# Patient Record
Sex: FEMALE | Race: WHITE | Employment: STUDENT | URBAN - METROPOLITAN AREA
[De-identification: names, ages, dates, MRNs, and addresses within clinical notes are randomized per-mention and may not be internally consistent; named-entity substitution may affect disease eponyms.]

---

## 2018-08-23 ENCOUNTER — OFFICE VISIT (OUTPATIENT)
Dept: FAMILY MEDICINE CLINIC | Age: 48
End: 2018-08-23

## 2018-08-23 VITALS
RESPIRATION RATE: 16 BRPM | HEIGHT: 67 IN | DIASTOLIC BLOOD PRESSURE: 72 MMHG | WEIGHT: 145 LBS | SYSTOLIC BLOOD PRESSURE: 122 MMHG | OXYGEN SATURATION: 99 % | TEMPERATURE: 97.8 F | HEART RATE: 61 BPM | BODY MASS INDEX: 22.76 KG/M2

## 2018-08-23 DIAGNOSIS — J45.30 MILD PERSISTENT ASTHMA WITHOUT COMPLICATION: ICD-10-CM

## 2018-08-23 DIAGNOSIS — Z13.220 SCREENING FOR LIPID DISORDERS: ICD-10-CM

## 2018-08-23 DIAGNOSIS — R00.2 HEART PALPITATIONS: ICD-10-CM

## 2018-08-23 DIAGNOSIS — D22.9 MULTIPLE NEVI: ICD-10-CM

## 2018-08-23 DIAGNOSIS — D50.9 IRON DEFICIENCY ANEMIA, UNSPECIFIED IRON DEFICIENCY ANEMIA TYPE: ICD-10-CM

## 2018-08-23 DIAGNOSIS — Z00.00 ROUTINE PHYSICAL EXAMINATION: Primary | ICD-10-CM

## 2018-08-23 DIAGNOSIS — Z12.39 SCREENING FOR BREAST CANCER: ICD-10-CM

## 2018-08-23 RX ORDER — BUDESONIDE AND FORMOTEROL FUMARATE DIHYDRATE 160; 4.5 UG/1; UG/1
2 AEROSOL RESPIRATORY (INHALATION) DAILY
COMMUNITY
End: 2018-08-23 | Stop reason: SDUPTHER

## 2018-08-23 RX ORDER — BUDESONIDE AND FORMOTEROL FUMARATE DIHYDRATE 160; 4.5 UG/1; UG/1
2 AEROSOL RESPIRATORY (INHALATION) 2 TIMES DAILY
Qty: 3 INHALER | Refills: 3 | Status: SHIPPED | OUTPATIENT
Start: 2018-08-23 | End: 2020-01-20 | Stop reason: SDUPTHER

## 2018-08-23 RX ORDER — ALBUTEROL SULFATE 90 UG/1
2 AEROSOL, METERED RESPIRATORY (INHALATION)
Qty: 3 INHALER | Refills: 3 | Status: SHIPPED | OUTPATIENT
Start: 2018-08-23 | End: 2020-01-20 | Stop reason: SDUPTHER

## 2018-08-23 RX ORDER — ALBUTEROL SULFATE 90 UG/1
AEROSOL, METERED RESPIRATORY (INHALATION)
COMMUNITY
End: 2018-08-23 | Stop reason: SDUPTHER

## 2018-08-23 RX ORDER — BUDESONIDE AND FORMOTEROL FUMARATE DIHYDRATE 160; 4.5 UG/1; UG/1
2 AEROSOL RESPIRATORY (INHALATION) 2 TIMES DAILY
Status: CANCELLED | OUTPATIENT
Start: 2018-08-23

## 2018-08-23 NOTE — MR AVS SNAPSHOT
[address] 
[phone] Patient: Isaías Young MRN: [id number] [id number] Visit Information Date & Time Provider Department Dept. Phone Encounter #  
 2018  2:30 PM Reynaldo Campbell, [address] [place] [address] [id number] Follow-up Instructions Return in about 1 year (around 2019). Upcoming Health Maintenance Date Due DTaP/Tdap/Td series (1 - Tdap) 1993 PAP AKA CERVICAL CYTOLOGY 1993 Influenza Age 5 to Adult 2018 Allergies as of 2018  Review Complete On: 2018 By: LADY Patterson No Known Allergies Current Immunizations  Never Reviewed No immunizations on file. Not reviewed this visit You Were Diagnosed With   
  
 Codes Comments Routine physical examination    -  Primary ICD-10-CM: Z00.00 ICD-9-CM: V70.0 Screening for breast cancer     ICD-10-CM: Z12.31 
ICD-9-CM: V76.10 Mild persistent asthma without complication     SEAN-67-AE: J45.30 ICD-9-CM: 493.90 Heart palpitations     ICD-10-CM: R00.2 ICD-9-CM: 785.1 Multiple nevi     ICD-10-CM: D22.9 ICD-9-CM: 216.9 Screening for lipid disorders     ICD-10-CM: Z13.220 ICD-9-CM: V77.91 Iron deficiency anemia, unspecified iron deficiency anemia type     ICD-10-CM: D50.9 ICD-9-CM: 280.9 Vitals BP Pulse Temp Resp Height(growth percentile) Weight(growth percentile) 122/72 (BP 1 Location: Left arm, BP Patient Position: Sitting) 61 97.8 °F (36.6 °C) (Oral) 16 5' 7.25\" (1.708 m) 145 lb (65.8 kg) LMP SpO2 BMI OB Status Smoking Status 2018 99% 22.54 kg/m2 Having regular periods Never Smoker Vitals History BMI and BSA Data Body Mass Index Body Surface Area  
 22.54 kg/m 2 1.77 m 2 Preferred Pharmacy Pharmacy Name Phone [address] [phone] Your Updated Medication List  
  
   
This list is accurate as of 8/23/18  3:57 PM.  Always use your most recent med list.  
  
  
  
  
 albuterol 90 mcg/actuation inhaler Commonly known as:  PROVENTIL HFA, VENTOLIN HFA, PROAIR HFA Take 2 Puffs by inhalation every four (4) hours as needed for Wheezing. budesonide-formoterol 160-4.5 mcg/actuation Hfaa Commonly known as:  SYMBICORT Take 2 Puffs by inhalation two (2) times a day. Prescriptions Printed Refills  
 albuterol (PROVENTIL HFA, VENTOLIN HFA, PROAIR HFA) 90 mcg/actuation inhaler 3 Sig: Take 2 Puffs by inhalation every four (4) hours as needed for Wheezing. Class: Print Route: Inhalation  
 budesonide-formoterol (SYMBICORT) 160-4.5 mcg/actuation HFAA 3 Sig: Take 2 Puffs by inhalation two (2) times a day. Class: Print Route: Inhalation We Performed the Following REFERRAL TO CARDIOLOGY [ULV37 Custom] REFERRAL TO DERMATOLOGY [REF19 Custom] Follow-up Instructions Return in about 1 year (around 8/23/2019). To-Do List   
 08/23/2018 Lab:  IRON PROFILE   
  
 08/23/2018 Imaging:  [facility] MAMMO BI SCREENING INCL CAD   
  
 08/23/2018 Lab:  METABOLIC PANEL, COMPREHENSIVE Around 08/24/2018 Lab:  CBC WITH AUTOMATED DIFF Around 08/24/2018 Lab:  FERRITIN Around 08/24/2018 Lab:  LIPID PANEL Around 08/24/2018 Lab:  TSH 3RD GENERATION Referral Information Referral ID Referred By Referred To  
  
 [id number] Rosalind Hannah 83, DO   
   [facility] Cristino [facility] [address] [place], [address] [facility] [address] Phone: [phone] Fax: [phone] Visits Status Start Date End Date 1 New Request 8/23/18 8/23/19 If your referral has a status of pending review or denied, additional information will be sent to support the outcome of this decision. Referral ID Referred By Referred To [id number] [facility] [place] [address] Junie Song Phone: [phone] Fax: [phone] Visits Status Start Date End Date 1 New Request 8/23/18 8/23/19 If your referral has a status of pending review or denied, additional information will be sent to support the outcome of this decision. Patient Instructions A Healthy Lifestyle: Care Instructions Your Care Instructions A healthy lifestyle can help you feel good, stay at a healthy weight, and have plenty of energy for both work and play. A healthy lifestyle is something you can share with your whole family. A healthy lifestyle also can lower your risk for serious health problems, such as high blood pressure, heart disease, and diabetes. You can follow a few steps listed below to improve your health and the health of your family. Follow-up care is a key part of your treatment and safety. Be sure to make and go to all appointments, and call your doctor if you are having problems. It's also a good idea to know your test results and keep a list of the medicines you take. How can you care for yourself at home? · Do not eat too much sugar, fat, or fast foods. You can still have dessert and treats now and then. The goal is moderation. · Start small to improve your eating habits. Pay attention to portion sizes, drink less juice and soda pop, and eat more fruits and vegetables. ¨ Eat a healthy amount of food. A 3-ounce serving of meat, for example, is about the size of a deck of cards. Fill the rest of your plate with vegetables and whole grains. ¨ Limit the amount of soda and sports drinks you have every day. Drink more water when you are thirsty. ¨ Eat at least 5 servings of fruits and vegetables every day.  It may seem like a lot, but it is not hard to reach this goal. A serving or helping is 1 piece of fruit, 1 cup of vegetables, or 2 cups of leafy, raw vegetables. Have an apple or some carrot sticks as an afternoon snack instead of a candy bar. Try to have fruits and/or vegetables at every meal. 
· Make exercise part of your daily routine. You may want to start with simple activities, such as walking, bicycling, or slow swimming. Try to be active 30 to 60 minutes every day. You do not need to do all 30 to 60 minutes all at once. For example, you can exercise 3 times a day for 10 or 20 minutes. Moderate exercise is safe for most people, but it is always a good idea to talk to your doctor before starting an exercise program. 
· Keep moving. Hien Briggsis the lawn, work in the garden, or [employer]. Take the stairs instead of the elevator at work. · If you smoke, quit. People who smoke have an increased risk for heart attack, stroke, cancer, and other lung illnesses. Quitting is hard, but there are ways to boost your chance of quitting tobacco for good. ¨ Use nicotine gum, patches, or lozenges. ¨ Ask your doctor about stop-smoking programs and medicines. ¨ Keep trying. In addition to reducing your risk of diseases in the future, you will notice some benefits soon after you stop using tobacco. If you have shortness of breath or asthma symptoms, they will likely get better within a few weeks after you quit. · Limit how much alcohol you drink. Moderate amounts of alcohol (up to 2 drinks a day for men, 1 drink a day for women) are okay. But drinking too much can lead to liver problems, high blood pressure, and other health problems. Family health If you have a family, there are many things you can do together to improve your health. · Eat meals together as a family as often as possible. · Eat healthy foods. This includes fruits, vegetables, lean meats and dairy, and whole grains. · Include your family in your fitness plan.  Most people think of activities such as jogging or tennis as the way to fitness, but there are many ways you and your family can be more active. Anything that makes you breathe hard and gets your heart pumping is exercise. Here are some tips: 
¨ Walk to do errands or to take your child to school or the bus. ¨ Go for a family bike ride after dinner instead of watching TV. Where can you learn more? Go to http://aidee-emily.info/. Enter F754 in the search box to learn more about \"A Healthy Lifestyle: Care Instructions. \" Current as of: December 7, 2017 Content Version: 11.7 © 0930-2044 [employer]. Care instructions adapted under license by [employer] (which disclaims liability or warranty for this information). If you have questions about a medical condition or this instruction, always ask your healthcare professional. [address] any warranty or liability for your use of this information. Introducing [facility]! [facility] introduces [employer] patient portal. Now you can access parts of your medical record, email your doctor's office, and request medication refills online. 1. In your internet browser, go to [email or web address] 2. Click on the First Time User? Click Here link in the Sign In box. You will see the New Member Sign Up page. 3. Enter your [employer] Access Code exactly as it appears below. You will not need to use this code after youve completed the sign-up process. If you do not sign up before the expiration date, you must request a new code. · [employer] Access Code: [email or web address] Expires: 11/21/2018  3:27 PM 
 
4. Enter the last four digits of your Social Security Number (xxxx) and Date of Birth (mm/dd/yyyy) as indicated and click Submit. You will be taken to the next sign-up page. 5. Create a Tr[employer]parqt ID.  This will be your [employer] login ID and cannot be changed, so think of one that is secure and easy to remember. 6. Create a [employer] password. You can change your password at any time. 7. Enter your Password Reset Question and Answer. This can be used at a later time if you forget your password. 8. Enter your e-mail address. You will receive e-mail notification when new information is available in [address] 9. Click Sign Up. You can now view and download portions of your medical record. 10. Click the Download Summary menu link to download a portable copy of your medical information. If you have questions, please visit the Frequently Asked Questions section of the [employer] website. Remember, [employer] is NOT to be used for urgent needs. For medical emergencies, dial 911. Now available from your iPhone and Android! Please provide this summary of care documentation to your next provider. Your primary care clinician is listed as Reynaldo Campbell. If you have any questions after today's visit, please call [phone].

## 2018-08-23 NOTE — PROGRESS NOTES
Patient: Paulie Tracy MRN: [id number]  SSN: [id number]    YOB: 1970  Age: 57 y.o. Sex: female      Date of Service: 8/23/2018   Provider: LADY Glez   Office Location:   [address] [place] [address] Dr Vick reynoso, [address] [facility].  Office Phone: [phone]  Office Fax: [phone]        REASON FOR VISIT:   Chief Complaint   Patient presents with    New Patient    Medication Refill        VITALS:   Visit Vitals    /72 (BP 1 Location: Left arm, BP Patient Position: Sitting)    Pulse 61    Temp 97.8 °F (36.6 °C) (Oral)    Resp 16    Ht 5' 7.25\" (1.708 m)    Wt 145 lb (65.8 kg)    LMP 08/20/2018    SpO2 99%    BMI 22.54 kg/m2       MEDICATIONS:        ALLERGIES:   No Known Allergies     MEDICAL/SURGICAL HISTORY:  Past Medical History:   Diagnosis Date    Anemia     Asthma       No past surgical history on file. FAMILY HISTORY:  No family history on file. SOCIAL HISTORY:  Social History   Substance Use Topics    Smoking status: Never Smoker    Smokeless tobacco: Never Used    Alcohol use Yes      Comment: rare            HISTORY OF PRESENT ILLNESS:   Pualie Tracy is a 57 y.o. female who presents to the office to establish care as a new patient. She is relatively new to the area. Her family moved here last summer for her 's job in the [employer]. Patient reports she has been in generally good health. She follows a healthy diet and exercises regularly. Asthma -   Mild-moderate persistent. Has been well controlled on Symbicort, with some failed attempts at de-escalating. Triggers include exercise, and she only uses her albuterol inhaler before a long run or strenuous workout. She needs refills sent to [facility] today. Anemia -   Patient reports a history of iron deficiency anemia. She follows a vegetarian diet.  She takes a daily iron supplement    Palpitations -   Patient reports a long history of intermittent heart palpitations. This was last worked up a few years ago by EKG, echo, and Holter monitor. Per the patient, there was no evidence of structural heart disease, just some \"extra beats\" on electrocardiography. She reports palpitations are triggered by caffeine, and by holding her breath (such as when she's swimming). They seem to be getting a bit more frequent lately. Multiple Nevi -   Patient requests a referral to dermatology. She likes to get skin checks periodically due to her multiple moles. She has no personal history of skin ca, and no family history of melanoma. Health Maintenance -   [facility] PCP - [facility]   Last PE with routine labs - PE a little over a year ago, labs have not been done in several years  Last pap smear - 2017, normal per patient   Last mammogram - 2017, normal per patient   Immunization status - UTD     REVIEW OF SYSTEMS:  ROS (see scanned H&P form for complete 12 point ROS)     PHYSICAL EXAMINATION:  Physical Exam   Constitutional: She is oriented to person, place, and time and well-developed, well-nourished, and in no distress. HENT:   Head: Normocephalic and atraumatic. Right Ear: External ear normal.   Left Ear: External ear normal.   Nose: Nose normal.   Mouth/Throat: Oropharynx is clear and moist.   Eyes: Conjunctivae are normal. Pupils are equal, round, and reactive to light. Right eye exhibits no discharge. Left eye exhibits no discharge. Neck: Neck supple. No thyromegaly present. Cardiovascular: Normal rate, regular rhythm and normal heart sounds. Exam reveals no gallop and no friction rub. No murmur heard. Pulmonary/Chest: Effort normal and breath sounds normal. No stridor. She has no wheezes. She has no rales. Abdominal: Soft. Bowel sounds are normal. She exhibits no distension and no mass. There is no tenderness. There is no rebound and no guarding. Lymphadenopathy:     She has no cervical adenopathy.    Neurological: She is alert and oriented to person, place, and time. Gait normal.   Skin: Skin is warm and dry. No rash noted. Psychiatric: Mood, memory and affect normal.        RESULTS:  No results found for this visit on 08/23/18. ASSESSMENT/PLAN:  Diagnoses and all orders for this visit:    1. Routine physical examination  - Normal physical exam findings today  - Age and gender specific counseling provided  - Copy of recent pap requested. Will be due in spring of 2020     2. Screening for breast cancer  - Mammo ordered  Orders:    -     [facility] MAMMO BI SCREENING INCL CAD; Future    3. Mild persistent asthma without complication  - Stable on current regimen  - meds refilled  Orders:    -     albuterol (PROVENTIL HFA, VENTOLIN HFA, PROAIR HFA) 90 mcg/actuation inhaler; Take 2 Puffs by inhalation every four (4) hours as needed for Wheezing.  -     budesonide-formoterol (SYMBICORT) 160-4.5 mcg/actuation HFAA; Take 2 Puffs by inhalation two (2) times a day. 4. Heart palpitations  - Will refer to cardiology for further evaluation, to clarify that these are truly benign extrasystoles and determine whether or not patient may benefit from a beta blocker  Orders:    -     Oscar Manifold ref [facility]  -     TSH 3RD GENERATION; Future  -     CBC WITH AUTOMATED DIFF; Future    5. Multiple nevi  - Referred for skin check  Orders:    -     REFERRAL TO DERMATOLOGY    6. Screening for lipid disorders  - Check routine fasting labs at earliest convenience  Orders:    -     LIPID PANEL; Future  -     METABOLIC PANEL, COMPREHENSIVE; Future    7. Iron deficiency anemia, unspecified iron deficiency anemia type   - Check labs  - Continue iron supplement for now  Orders:    -     CBC WITH AUTOMATED DIFF; Future  -     FERRITIN; Future  -     IRON PROFILE; Future        Follow-up Disposition:  Return in about 1 year (around 8/23/2019).        PATIENT CARE TEAM:   Patient Care Team:  LADY Luna as PCP - General (Physician Assistant)       Christie Carbajal Earline Curl, [address]   August 23, 2018    4:38 PM

## 2018-08-23 NOTE — PROGRESS NOTES
1. Have you been to the ER, urgent care clinic since your last visit? Hospitalized since your last visit? No    2. Have you seen or consulted any other health care providers outside of the [address] [place] [address] since your last visit? Include any pap smears or colon screening. No    Last pap smear - 2017 [facility]   Last flu vaccine 2017

## 2018-08-23 NOTE — PATIENT INSTRUCTIONS

## 2018-11-30 ENCOUNTER — HOSPITAL ENCOUNTER (OUTPATIENT)
Dept: MAMMOGRAPHY | Age: 48
Discharge: HOME | End: 2018-11-30
Attending: PHYSICIAN ASSISTANT
Payer: OTHER GOVERNMENT

## 2018-11-30 DIAGNOSIS — Z12.39 SCREENING FOR BREAST CANCER: ICD-10-CM

## 2018-11-30 PROCEDURE — 77067 SCR MAMMO BI INCL CAD: CPT

## 2018-11-30 PROCEDURE — 77063 BREAST TOMOSYNTHESIS BI: CPT

## 2018-12-04 ENCOUNTER — TELEPHONE (OUTPATIENT)
Dept: FAMILY MEDICINE CLINIC | Age: 48
End: 2018-12-04

## 2018-12-04 DIAGNOSIS — R92.8 ABNORMALITY OF BOTH BREASTS ON SCREENING MAMMOGRAM: Primary | ICD-10-CM

## 2018-12-04 NOTE — TELEPHONE ENCOUNTER
1: 17 p.m Called home number. Verified name and . Spoke with patient. Patient notified of recent results per provider. Patient understood the reason for call and had no further questions or concerns.

## 2018-12-04 NOTE — PROGRESS NOTES
Please notify patient that mammogram showed a possible abnormality in both breasts. If she is able to obtain the images from a previous mammogram to send to our breast center, they will be able to compare and see if this is a new finding, or if her breasts have always appeared this way. The other option would be to return for a diagnostic mammogram and ultrasound for further evaluation. I will place the orders, and she can follow up with the breast center however she chooses to proceed.

## 2018-12-04 NOTE — PROGRESS NOTES
Called home number. Verified name and . Spoke with patient. Patient notified of results below per Debbie NARAYANAN. Patient understood the reason for call and had no further questions or concerns.

## 2018-12-11 NOTE — PROGRESS NOTES
Called home number. Verified name and . Spoke with patient. Patient notified of message below per Jalen NARAYANAN. Patient understood the reason for call and had no further questions or concerns.

## 2018-12-11 NOTE — PROGRESS NOTES
After comparison to her prior mammograms, findings were determined to benign and stable. Will repeat mammogram in 1 year.

## 2018-12-11 NOTE — PROGRESS NOTES
Called home number. Left message on answering machine to return the call. This call was concerning message below per Emeterio NARAYANAN.

## 2019-10-01 DIAGNOSIS — J45.30 MILD PERSISTENT ASTHMA WITHOUT COMPLICATION: ICD-10-CM

## 2019-10-01 RX ORDER — BUDESONIDE AND FORMOTEROL FUMARATE DIHYDRATE 160; 4.5 UG/1; UG/1
2 AEROSOL RESPIRATORY (INHALATION) 2 TIMES DAILY
Qty: 3 INHALER | Refills: 3 | OUTPATIENT
Start: 2019-10-01

## 2019-10-01 NOTE — TELEPHONE ENCOUNTER
This pharmacy faxed over request for the following prescriptions to be filled:    Medication requested :   Requested Prescriptions     Pending Prescriptions Disp Refills    budesonide-formoterol (SYMBICORT) 160-4.5 mcg/actuation HFAA 3 Inhaler 3     Sig: Take 2 Puffs by inhalation two (2) times a day. PCP: [address] [place] [address] Drive or Print:  [facility]   Mail order or Local pharmacy Mail Order     Scheduled appointment if not seen by current providers in office:  [address] 8/23/2019 pharmacy is requesting a replacement for RX that got lost in the mail . LMOV to schedule appt tru 8/23/2019  Please advise.

## 2019-10-01 NOTE — TELEPHONE ENCOUNTER
Last OV: 08-  Last labs: N/A  Next OV and labs: no appointment on file    Left message via voicemail to confirm if patient has changed PCP

## 2019-10-02 NOTE — TELEPHONE ENCOUNTER
Overdue for follow-up. Was seen for CPE in Aug 2018 and advised on 1 year follow-up. If needs a 1 month supply sent locally, we can do that until she gets in. Send refill request to me if so.

## 2020-01-20 ENCOUNTER — OFFICE VISIT (OUTPATIENT)
Dept: FAMILY MEDICINE CLINIC | Age: 50
End: 2020-01-20

## 2020-01-20 VITALS
RESPIRATION RATE: 16 BRPM | HEIGHT: 68 IN | TEMPERATURE: 98.3 F | OXYGEN SATURATION: 98 % | DIASTOLIC BLOOD PRESSURE: 74 MMHG | BODY MASS INDEX: 21.98 KG/M2 | SYSTOLIC BLOOD PRESSURE: 116 MMHG | HEART RATE: 64 BPM | WEIGHT: 145 LBS

## 2020-01-20 DIAGNOSIS — J45.30 MILD PERSISTENT ASTHMA WITHOUT COMPLICATION: Primary | ICD-10-CM

## 2020-01-20 DIAGNOSIS — Z13.220 SCREENING FOR LIPID DISORDERS: ICD-10-CM

## 2020-01-20 DIAGNOSIS — D50.9 IRON DEFICIENCY ANEMIA, UNSPECIFIED IRON DEFICIENCY ANEMIA TYPE: ICD-10-CM

## 2020-01-20 DIAGNOSIS — Z12.39 SCREENING FOR BREAST CANCER: ICD-10-CM

## 2020-01-20 RX ORDER — BUDESONIDE AND FORMOTEROL FUMARATE DIHYDRATE 160; 4.5 UG/1; UG/1
2 AEROSOL RESPIRATORY (INHALATION) 2 TIMES DAILY
Qty: 1 INHALER | Refills: 0 | Status: SHIPPED | OUTPATIENT
Start: 2020-01-20

## 2020-01-20 RX ORDER — ALBUTEROL SULFATE 90 UG/1
2 AEROSOL, METERED RESPIRATORY (INHALATION)
Qty: 3 INHALER | Refills: 3 | Status: SHIPPED | OUTPATIENT
Start: 2020-01-20 | End: 2020-01-20 | Stop reason: SDUPTHER

## 2020-01-20 RX ORDER — ALBUTEROL SULFATE 90 UG/1
2 AEROSOL, METERED RESPIRATORY (INHALATION)
Qty: 1 INHALER | Refills: 0 | Status: SHIPPED | OUTPATIENT
Start: 2020-01-20

## 2020-01-20 RX ORDER — BUDESONIDE AND FORMOTEROL FUMARATE DIHYDRATE 160; 4.5 UG/1; UG/1
2 AEROSOL RESPIRATORY (INHALATION) 2 TIMES DAILY
Qty: 3 INHALER | Refills: 3 | Status: SHIPPED | OUTPATIENT
Start: 2020-01-20 | End: 2020-01-20 | Stop reason: SDUPTHER

## 2020-01-20 NOTE — PROGRESS NOTES
1. Have you been to the ER, urgent care clinic since your last visit? Hospitalized since your last visit? No    2. Have you seen or consulted any other health care providers outside of the [address] [place] [address] since your last visit? Include any pap smears or colon screening.  No

## 2020-01-20 NOTE — PROGRESS NOTES
Patient: Nate Wong MRN: [id number]  SSN: [id number]    YOB: 1970  Age: 54 y.o. Sex: female      Date of Service: 2020   Provider: LADY Castillo   Office Location:   [address] [place], [address] [facility] [address] [facility]  Office Phone: [phone]  Office Fax: [phone]      REASON FOR VISIT:   Chief Complaint   Patient presents with    Asthma        VITALS:   Visit Vitals  /74 (BP 1 Location: Left arm, BP Patient Position: Sitting)   Pulse 64   Temp 98.3 °F (36.8 °C) (Oral)   Resp 16   Ht 5' 7.5\" (1.715 m)   Wt 145 lb (65.8 kg)   LMP 2019   SpO2 98%   BMI 22.38 kg/m²        MEDICATIONS:   Current Outpatient Medications on File Prior to Visit   Medication Sig Dispense Refill    budesonide-formoterol (SYMBICORT) 160-4.5 mcg/actuation HFAA Take 2 Puffs by inhalation two (2) times a day. 3 Inhaler 3    albuterol (PROVENTIL HFA, VENTOLIN HFA, PROAIR HFA) 90 mcg/actuation inhaler Take 2 Puffs by inhalation every four (4) hours as needed for Wheezing. 3 Inhaler 3     No current facility-administered medications on file prior to visit. ALLERGIES:   No Known Allergies     MEDICAL/SURGICAL HISTORY:  Past Medical History:   Diagnosis Date    Anemia     Asthma       Past Surgical History:   Procedure Laterality Date    HX  SECTION       &     HX HERNIA REPAIR  1979        FAMILY HISTORY:  Family History   Problem Relation Age of Onset    Substance Abuse Mother         tobacco use    High Cholesterol Father     Alcohol abuse Brother     Substance Abuse Brother         tobacco use        SOCIAL HISTORY:  Social History     Tobacco Use    Smoking status: Never Smoker    Smokeless tobacco: Never Used   Substance Use Topics    Alcohol use: Yes     Comment: rare    Drug use: No             HISTORY OF PRESENT ILLNESS: Nate Wong is a 54 y.o. female who presents to the office for a routine follow up visit. Asthma -   Mild-moderate persistent. Has been well controlled on Symbicort, with some failed attempts at de-escalating. Triggers include exercise, and she only uses her albuterol inhaler before a long run or strenuous workout. Her main reason for scheduling today's visit is that she simply needs refills of her inhalers.       Anemia -   Patient reports a history of iron deficiency anemia. She follows a vegetarian diet. She takes a daily iron supplement. She never had the labs done that were ordered last year.        REVIEW OF SYSTEMS:  Review of Systems   Constitutional: Negative for chills, fever and malaise/fatigue. Respiratory: Negative for cough, shortness of breath and wheezing. Cardiovascular: Negative for chest pain, palpitations and leg swelling. PHYSICAL EXAMINATION:  Physical Exam  Cardiovascular:      Rate and Rhythm: Normal rate and regular rhythm. Heart sounds: Normal heart sounds. No murmur. No friction rub. No gallop. Pulmonary:      Effort: Pulmonary effort is normal.      Breath sounds: Normal breath sounds. No wheezing or rales. Skin:     General: Skin is warm and dry. Findings: No rash. Neurological:      Mental Status: She is alert and oriented to person, place, and time. Gait: Gait is intact. Psychiatric:         Mood and Affect: Mood and affect normal.         Cognition and Memory: Memory normal.          RESULTS:  No results found for this visit on 01/20/20. ASSESSMENT/PLAN:  Diagnoses and all orders for this visit:    1. Mild persistent asthma without complication  - Inhalers refilled  Orders:    -     albuterol (PROVENTIL HFA, VENTOLIN HFA, PROAIR HFA) 90 mcg/actuation inhaler; Take 2 Puffs by inhalation every four (4) hours as needed for Wheezing.  -     budesonide-formoteroL (SYMBICORT) 160-4.5 mcg/actuation HFAA; Take 2 Puffs by inhalation two (2) times a day.     2. Screening for breast cancer  Orders:    -     [facility] MAMMO BI SCREENING INCL CAD; Future    3. Iron deficiency anemia, unspecified iron deficiency anemia type  4. Screening for lipid disorders  - Fasting labs ordered to be completed prior to well woman visit   Orders:    -     CBC WITH AUTOMATED DIFF; Future  -     FERRITIN; Future  -     IRON PROFILE; Future  -     METABOLIC PANEL, COMPREHENSIVE; Future  -     LIPID PANEL; Future        Follow-up and Dispositions    · Return in about 3 months (around 4/20/2020) for WWE/Pap. All questions answered. Patient expresses understanding and agrees with the plan as detailed above.     PATIENT CARE TEAM:   Patient Care Team:  LADY Cummings as PCP - General (Physician Assistant)  LADY Cummings as PCP - [facility] Provider       LADY Alanis   January 20, 2020   4:11 PM

## 2020-01-23 ENCOUNTER — TELEPHONE (OUTPATIENT)
Dept: FAMILY MEDICINE CLINIC | Age: 50
End: 2020-01-23

## 2020-01-23 DIAGNOSIS — J45.31 MILD PERSISTENT ASTHMA WITH EXACERBATION: Primary | ICD-10-CM

## 2020-01-23 NOTE — TELEPHONE ENCOUNTER
Place call to Chau Barrera at 5:05 pm, voicemail was left explaining to her that per Tristen at [facility] their system states it is too soon for her to  inhaler (the soonest she can  is Tuesday 01-) due to [employer] process  order on there end on  01-.  Out of pocket is axp 125.00 -225.00 per [facility]

## 2020-01-24 RX ORDER — METHYLPREDNISOLONE 4 MG/1
TABLET ORAL
Qty: 1 DOSE PACK | Refills: 0 | Status: SHIPPED | OUTPATIENT
Start: 2020-01-24 | End: 2020-02-17 | Stop reason: ALTCHOICE

## 2020-01-24 NOTE — TELEPHONE ENCOUNTER
I am going to send her a short course of oral steroids to help her asthma through the weekend while she waits until she can  her inhaler on Tues.

## 2020-02-17 ENCOUNTER — HOSPITAL ENCOUNTER (OUTPATIENT)
Dept: LAB | Age: 50
Discharge: HOME | End: 2020-02-17
Payer: OTHER GOVERNMENT

## 2020-02-17 ENCOUNTER — OFFICE VISIT (OUTPATIENT)
Dept: FAMILY MEDICINE CLINIC | Age: 50
End: 2020-02-17

## 2020-02-17 VITALS
SYSTOLIC BLOOD PRESSURE: 120 MMHG | DIASTOLIC BLOOD PRESSURE: 68 MMHG | HEART RATE: 75 BPM | OXYGEN SATURATION: 99 % | TEMPERATURE: 98.1 F | BODY MASS INDEX: 21.82 KG/M2 | WEIGHT: 144 LBS | HEIGHT: 68 IN | RESPIRATION RATE: 18 BRPM

## 2020-02-17 DIAGNOSIS — Z01.419 WELL WOMAN EXAM WITH ROUTINE GYNECOLOGICAL EXAM: Primary | ICD-10-CM

## 2020-02-17 DIAGNOSIS — Z12.4 SCREENING FOR CERVICAL CANCER: ICD-10-CM

## 2020-02-17 DIAGNOSIS — Z12.39 SCREENING FOR BREAST CANCER: ICD-10-CM

## 2020-02-17 PROCEDURE — 87624 HPV HI-RISK TYP POOLED RSLT: CPT

## 2020-02-17 PROCEDURE — 88175 CYTOPATH C/V AUTO FLUID REDO: CPT

## 2020-02-17 NOTE — PROGRESS NOTES
1. Have you been to the ER, urgent care clinic since your last visit? Hospitalized since your last visit? No    2. Have you seen or consulted any other health care providers outside of the [address] [place] [address] since your last visit? Include any pap smears or colon screening.  No

## 2020-02-17 NOTE — PATIENT INSTRUCTIONS
Well Visit, Ages 25 to 48: Care Instructions  Your Care Instructions    Physical exams can help you stay healthy. Your doctor has checked your overall health and may have suggested ways to take good care of yourself. He or she also may have recommended tests. At home, you can help prevent illness with healthy eating, regular exercise, and other steps. Follow-up care is a key part of your treatment and safety. Be sure to make and go to all appointments, and call your doctor if you are having problems. It's also a good idea to know your test results and keep a list of the medicines you take. How can you care for yourself at home? · Reach and stay at a healthy weight. This will lower your risk for many problems, such as obesity, diabetes, heart disease, and high blood pressure. · Get at least 30 minutes of physical activity on most days of the week. Walking is a good choice. You also may want to do other activities, such as running, swimming, cycling, or playing tennis or team sports. Discuss any changes in your exercise program with your doctor. · Do not smoke or allow others to smoke around you. If you need help quitting, talk to your doctor about stop-smoking programs and medicines. These can increase your chances of quitting for good. · Talk to your doctor about whether you have any risk factors for sexually transmitted infections (STIs). Having one sex partner (who does not have STIs and does not have sex with anyone else) is a good way to avoid these infections. · Use birth control if you do not want to have children at this time. Talk with your doctor about the choices available and what might be best for you. · Protect your skin from too much sun. When you're outdoors from 10 a.m. to 4 p.m., stay in the shade or cover up with clothing and a hat with a wide brim. Wear sunglasses that block UV rays. Even when it's cloudy, put broad-spectrum sunscreen (SPF 30 or higher) on any exposed skin.   · See a dentist one or two times a year for checkups and to have your teeth cleaned. · Wear a seat belt in the car. Follow your doctor's advice about when to have certain tests. These tests can spot problems early. For everyone  · Cholesterol. Have the fat (cholesterol) in your blood tested after age 21. Your doctor will tell you how often to have this done based on your age, family history, or other things that can increase your risk for heart disease. · Blood pressure. Have your blood pressure checked during a routine doctor visit. Your doctor will tell you how often to check your blood pressure based on your age, your blood pressure results, and other factors. · Vision. Talk with your doctor about how often to have a glaucoma test.  · Diabetes. Ask your doctor whether you should have tests for diabetes. · Colon cancer. Your risk for colorectal cancer gets higher as you get older. Some experts say that adults should start regular screening at age 48 and stop at age 76. Others say to start before age 48 or continue after age 76. Talk with your doctor about your risk and when to start and stop screening. For women  · Breast exam and mammogram. Talk to your doctor about when you should have a clinical breast exam and a mammogram. Medical experts differ on whether and how often women under 50 should have these tests. Your doctor can help you decide what is right for you. · Cervical cancer screening test and pelvic exam. Begin with a Pap test at age 24. The test often is part of a pelvic exam. Starting at age 27, you may choose to have a Pap test, an HPV test, or both tests at the same time (called co-testing). Talk with your doctor about how often to have testing. · Tests for sexually transmitted infections (STIs). Ask whether you should have tests for STIs. You may be at risk if you have sex with more than one person, especially if your partners do not wear condoms.   For men  · Tests for sexually transmitted infections (STIs). Ask whether you should have tests for STIs. You may be at risk if you have sex with more than one person, especially if you do not wear a condom. · Testicular cancer exam. Ask your doctor whether you should check your testicles regularly. · Prostate exam. Talk to your doctor about whether you should have a blood test (called a PSA test) for prostate cancer. Experts differ on whether and when men should have this test. Some experts suggest it if you are older than 39 and are -American or have a father or brother who got prostate cancer when he was younger than 72. When should you call for help? Watch closely for changes in your health, and be sure to contact your doctor if you have any problems or symptoms that concern you. Where can you learn more? Go to http://aidee-emily.info/. Enter P072 in the search box to learn more about \"Well Visit, Ages 25 to 48: Care Instructions. \"  Current as of: December 13, 2018  Content Version: 12.2  © 5766-9556 [employer]. Care instructions adapted under license by [employer] (which disclaims liability or warranty for this information). If you have questions about a medical condition or this instruction, always ask your healthcare professional. [place] [address] any warranty or liability for your use of this information. Pap Test: Care Instructions  Your Care Instructions    The Pap test (also called a Pap smear) is a screening test for cancer of the cervix, which is the lower part of the uterus that opens into the vagina. The test can help your doctor find early changes in the cells that could lead to cancer. The sample of cells taken during your test has been sent to a lab so that an expert can look at the cells. It usually takes a week or two to get the results back. Follow-up care is a key part of your treatment and safety.  Be sure to make and go to all appointments, and call your doctor if you are having problems. It's also a good idea to know your test results and keep a list of the medicines you take. What do the results mean? · A normal result means that the test did not find any abnormal cells in the sample. · An abnormal result can mean many things. Most of these are not cancer. The results of your test may be abnormal because:  ? You have an infection of the vagina or cervix, such as a yeast infection. ? You have an IUD (intrauterine device for birth control). ? You have low estrogen levels after menopause that are causing the cells to change. ? You have cell changes that may be a sign of precancer or cancer. The results are ranked based on how serious the changes might be. There are many other reasons why you might not get a normal result. If the results were abnormal, you may need to get another test within a few weeks or months. If the results show changes that could be a sign of cancer, you may need a test called a colposcopy, which provides a more complete view of the cervix. Sometimes the lab cannot use the sample because it does not contain enough cells or was not preserved well. If so, you may need to have the test again. This is not common, but it does happen from time to time. When should you call for help? Watch closely for changes in your health, and be sure to contact your doctor if:    · You have vaginal bleeding or pain for more than 2 days after the test. It is normal to have a small amount of bleeding for a day or two after the test.   Where can you learn more? Go to http://aidee-emily.info/. Enter K406 in the search box to learn more about \"Pap Test: Care Instructions. \"  Current as of: December 19, 2018  Content Version: 12.2  © 6963-1780 [employer]. Care instructions adapted under license by [employer] (which disclaims liability or warranty for this information).  If you have questions about a medical condition or this instruction, always ask your healthcare professional. [place] [address] any warranty or liability for your use of this information.

## 2020-02-17 NOTE — PROGRESS NOTES
Patient: Shahrzad Munoz MRN: [id number]  SSN: [id number]    YOB: 1970  Age: 54 y.o. Sex: female        Date of Service: 2020   Provider: Cheron Cowden, PA   Office Location:   Office Location:   [facility] [address], [address] [place], [address] [address]  Office Phone: [phone]  Office Fax: [phone]      REASON FOR VISIT:   Chief Complaint   Patient presents with    Well Woman     Pap Smear       VITALS:   Visit Vitals  /68 (BP 1 Location: Left arm, BP Patient Position: Sitting)   Pulse 75   Temp 98.1 °F (36.7 °C) (Oral)   Resp 18   Ht 5' 7.5\" (1.715 m)   Wt 144 lb (65.3 kg)   LMP 2020   SpO2 99%   BMI 22.22 kg/m²       MEDICATIONS:   Current Outpatient Medications on File Prior to Visit   Medication Sig Dispense Refill    budesonide-formoteroL (SYMBICORT) 160-4.5 mcg/actuation HFAA Take 2 Puffs by inhalation two (2) times a day. 1 Inhaler 0    [DISCONTINUED] methylPREDNISolone (MEDROL, SHAQ,) 4 mg tablet Take as directed per package insert 1 Dose Pack 0    albuterol (PROVENTIL HFA, VENTOLIN HFA, PROAIR HFA) 90 mcg/actuation inhaler Take 2 Puffs by inhalation every four (4) hours as needed for Wheezing. 1 Inhaler 0     No current facility-administered medications on file prior to visit.          ALLERGIES:   No Known Allergies     PAST MEDICAL HISTORY:  Past Medical History:   Diagnosis Date    Anemia     Asthma         SURGICAL HISTORY:  Past Surgical History:   Procedure Laterality Date    HX  SECTION       &     HX HERNIA REPAIR          FAMILY HISTORY:  Family History   Problem Relation Age of Onset    Substance Abuse Mother         tobacco use    High Cholesterol Father     Alcohol abuse Brother     Substance Abuse Brother         tobacco use        SOCIAL HISTORY:  Social History     Socioeconomic History    Marital status:      Spouse name: Not on file    Number of children: Not on file    Years of education: Not on file    Highest education level: Not on file   Tobacco Use    Smoking status: Never Smoker    Smokeless tobacco: Never Used   Substance and Sexual Activity    Alcohol use: Yes     Comment: rare    Drug use: No    Sexual activity: Yes     Partners: Male        OBSTETRIC HISTORY:  OB History    No obstetric history on file. MENSTRUAL HISTORY:  LMP: 1/27/20  Length of Menses: 5 days   Length of Cycle: 28-29 days   Menstrual Complaints: none    SEXUAL HISTORY:  Sexual activity: Patient reports she is not currently sexually active, but has been with male partners in the past   Contraceptive method: none   History of STIs: denies     HEALTH SCREENING HISTORY:  Last pap: approx 2017  Results: normal (per patient)   History of abnormal pap?: YES    Last mammogram: approx 2017, normal (per patient)  Last DEXA scan: N/A  Last colorectal screening: N/A       HPI:  Ernestine Angulo is a 54 y.o. female who presents to the office for her annual well woman exam.       REVIEW OF SYSTEMS:   Review of Systems   Constitutional: Negative for chills, fever and malaise/fatigue. Respiratory: Negative for cough, shortness of breath and wheezing. Cardiovascular: Negative for chest pain, palpitations and leg swelling. Gastrointestinal: Negative for abdominal pain, constipation, diarrhea, nausea and vomiting. Breasts: Denies masses, skin changes, nipple discharge  Genitourinary: Denies pelvic pain, dyspareunia, abnormal vaginal bleeding or discharge, urinary frequency, urgency, dysuria, hematuria. PHYSICAL EXAMINATION:   Physical Exam  Cardiovascular:      Rate and Rhythm: Normal rate and regular rhythm. Heart sounds: Normal heart sounds. No murmur. No friction rub. No gallop. Pulmonary:      Effort: Pulmonary effort is normal.      Breath sounds: Normal breath sounds. No wheezing or rales. Skin:     General: Skin is warm and dry. Findings: No rash.    Neurological:      Mental Status: She is alert and oriented to person, place, and time. Gait: Gait is intact. Psychiatric:         Mood and Affect: Mood and affect normal.         Cognition and Memory: Memory normal.     Breasts: Symmetric bilaterally without skin or nipple changes, tenderness, or masses  Pelvic: External genitalia - no erythema, rashes, or lesions. Internal - vagina pink rugae without lesions or discharge. Cervix - pink, non-friable, os patent with no discharge. No cervical motion tenderness. Bimanual - Uterus and adnexae non-tender. No masses palpated. ASSESSMENT/PLAN:  Diagnoses and all orders for this visit:    1. Well woman exam with routine gynecological exam  - Normal physical exam findings as detailed above  - Health screenings reviewed   - Age and gender specific counseling provided      2. Screening for cervical cancer  - Pap ordered  Orders:    -     PAP IG, APTIMA HPV AND RFX 16/18,45 ([id number]); Future    3. Screening for breast cancer  - Provided patient with central scheduling # for mammogram          Follow-up and Dispositions    · Return in about 1 year (around 2/17/2021) for pap smear .           LADY Joe  2/17/2020   3:52 PM

## 2021-05-01 DIAGNOSIS — J45.30 MILD PERSISTENT ASTHMA WITHOUT COMPLICATION: ICD-10-CM

## 2021-05-04 RX ORDER — BUDESONIDE AND FORMOTEROL FUMARATE DIHYDRATE 160; 4.5 UG/1; UG/1
AEROSOL RESPIRATORY (INHALATION)
Refills: 3 | OUTPATIENT
Start: 2021-05-04

## 2021-05-04 NOTE — TELEPHONE ENCOUNTER
Last OV: 2/17/2020  Last labs: pap only 2/18/2020  Next OV was due 2/2021    [facility] [phone] ([place])  for patient to call to schedule an asthma f/up.